# Patient Record
Sex: MALE | URBAN - METROPOLITAN AREA
[De-identification: names, ages, dates, MRNs, and addresses within clinical notes are randomized per-mention and may not be internally consistent; named-entity substitution may affect disease eponyms.]

---

## 2021-08-08 ENCOUNTER — OFFICE VISIT (OUTPATIENT)
Dept: URBAN - METROPOLITAN AREA CLINIC 37 | Facility: CLINIC | Age: 51
End: 2021-08-08

## 2021-09-07 ENCOUNTER — TELEPHONE ENCOUNTER (OUTPATIENT)
Dept: URBAN - METROPOLITAN AREA CLINIC 35 | Facility: CLINIC | Age: 51
End: 2021-09-07

## 2021-09-07 ENCOUNTER — OFFICE VISIT (OUTPATIENT)
Dept: URBAN - METROPOLITAN AREA CLINIC 35 | Facility: CLINIC | Age: 51
End: 2021-09-07

## 2021-09-07 ENCOUNTER — LAB OUTSIDE AN ENCOUNTER (OUTPATIENT)
Dept: URBAN - METROPOLITAN AREA CLINIC 35 | Facility: CLINIC | Age: 51
End: 2021-09-07

## 2021-09-07 VITALS
OXYGEN SATURATION: 99 % | DIASTOLIC BLOOD PRESSURE: 88 MMHG | HEART RATE: 60 BPM | BODY MASS INDEX: 30.16 KG/M2 | SYSTOLIC BLOOD PRESSURE: 144 MMHG | WEIGHT: 235 LBS | HEIGHT: 74 IN

## 2021-09-07 PROBLEM — 248478002: Status: ACTIVE | Noted: 2021-09-07

## 2021-09-07 PROBLEM — 305058001: Status: ACTIVE | Noted: 2021-09-07

## 2021-09-07 PROBLEM — 87522002: Status: ACTIVE | Noted: 2021-09-07

## 2021-09-07 PROBLEM — 420054005: Status: ACTIVE | Noted: 2021-09-07

## 2021-09-07 RX ORDER — DOXAZOSIN MESYLATE 4 MG/1
1 TABLET TABLET ORAL ONCE A DAY
Qty: 30 | Status: ACTIVE | COMMUNITY

## 2021-09-07 RX ORDER — FUROSEMIDE 20 MG/1
1 TABLET TABLET ORAL ONCE A DAY
Qty: 30 | Status: ACTIVE | COMMUNITY

## 2021-09-07 RX ORDER — PREGABALIN 25 MG/1
1 CAPSULE CAPSULE ORAL ONCE A DAY
Status: ACTIVE | COMMUNITY

## 2021-09-07 RX ORDER — SODIUM, POTASSIUM,MAG SULFATES 17.5-3.13G
177ML SOLUTION, RECONSTITUTED, ORAL ORAL ONCE
Qty: 1 KIT | Refills: 0 | OUTPATIENT
Start: 2021-09-07

## 2021-09-07 RX ORDER — SPIRONOLACTONE 50 MG/1
1 TABLET TABLET, FILM COATED ORAL QAM
Qty: 30 | Refills: 2 | OUTPATIENT
Start: 2021-09-07

## 2021-09-07 RX ORDER — METHYLPREDNISOLONE 8 MG/1
1 TABLET WITH FOOD OR MILK TABLET ORAL
Qty: 60 | Status: ACTIVE | COMMUNITY

## 2021-09-07 RX ORDER — TIZANIDINE 4 MG/1
1 TABLET AS NEEDED TABLET ORAL THREE TIMES A DAY
Status: ACTIVE | COMMUNITY

## 2021-09-07 RX ORDER — FOLIC ACID 1 MG/1
1 TABLET TABLET ORAL ONCE A DAY
Qty: 30 | Status: ACTIVE | COMMUNITY

## 2021-09-07 RX ORDER — METHYLPREDNISOLONE 8 MG/1
3 TABLETS TABLET ORAL QAM
Qty: 90 | Refills: 0 | OUTPATIENT
Start: 2021-09-07

## 2021-09-07 NOTE — HPI-MIGRATED HPI
Colorectal cancer screening : Patient is here for initial consultation for -> first time screening colonoscopy;   Colorectal cancer screening : Patients denies -> rectal bleeding, change in bowel habits, constipation, diarrhea, or abdominal pain;   Colorectal cancer screening : Current bowel movement patterns -> 1 soft BM daily??;   Colorectal cancer screening : Family history of GI malignancy: -> Denies;   Initial consultation : Patient is here for -> Alcoholic Cirrhosis  Patient was seen at [facility] on 08/10/2021 - 2021 due to scleral icterus and jaundice.  In the ED, the patient was found to have elevated bilirubin of 11.9, elevated ALT: 95, AST: 260 and thrombocytopenia, normal ammonia level.  During admission also had a CT abdomen/pelvis which showed liver cirrhosis.  Also had a MCRP which showed hepatic steatosis, cirrhosis, portal hypertension, upper abdominal varices, splenomegaly and trace abdominal pelvic ascites. Also showed 3.6 cm lesion in the right kidney upper pole concerning for renal cell carcinoma. Dr. Vikash Sosa was consulted and recommended o/p work-up.  * CT Abdomen/pelvis w/o IV contrast on 08/10/2021:  1. No obstructing renal or ureteral calculi. No acute intra-abdominal abnormalities.  2. cholelithiasis without evidence of cholecystitis  3. Colonic diverticulosis without evidence of diverticulitis  4. Cirrhotic liver morphology.  5. Splenomegaly   * MRI Abdomen w/ + w/o contrast on 2021:  1. Hepatic steatosis and cirrhosis. No evidence of hepatocellular carcinoma.  2. Patent portal vasculature with stigmata of portal hypertension including upper abdominal varices and splenomegaly (measures 15.8 cm). Trace abdominal pelvic ascites.  3. Right kidney upper pole, cystic enhancing lesion measuring 3.6 cm concerning for renal cell carcinoma. Recommend surgical consultation.   Patient was discharged in stable contidion and advised to follow up with me and Urologist.   Patient admits beer consumption in previous years. However, drank heavily between  and  after his divorce. Patient drank vodka and hard liquor during that time. In the last several years, he has only been drinking 3 or 4 times a week and reportedly with a few beer and 2-3 shots of mixed vodka per day.   Patient was seen by GI approximately 8 to 10 years ago due to fatty liver and abnormal liver enzymes but hasn't followed up with anyone in the past couple of years.   Current symptoms: denies confusion, pedal edema or abdominal distention  Last EGD done : reportedly gastric ulcers were found.   *** Labs during admission:  * 08/10/2021: Glu: 97; BUN: 18; Cr: 0.94; Na: 135 (L); K: 3.6; Alb: 3.4 (L); Tbili: 11.9 (H); ALT: 95 (H); AST: 260 (H); ALP: 204 (H); Lipase: 194 (H); Ethanol level: &lt; 10; Acetominophen level: &lt; 10; PT: 16.9 (H); INR: 144; PTT: 30.2; Ammonia: 41 - WBC: 9.0; RBC: 3.64 (L); Hgb: 13.4; Plt: 61 (L)  * 2021: HAV Ab IgM: negative; HBsAg: negative; HBcAb IgM: negative; HCV Ab: negative; INR: 1.67; PT: 19.6 (H); KEVIN screen with reflex: negative; F-[place] Ab Ig (nl); Mitochondrial M2 Ab Ig.7 (nl); Liver Microsome Ab: &lt;1:20; Alph1 AntiTrypsin level: 198 (nl); Alph1 Antitrypsin Phenotype: M1M1 (normal phenotype); AFP: 7.0 Iron level: 39 (L); TIBC: 184 (L); UIBC: 145 (L); Fe sat: 21%   * 2021: Glu: 83; BUN: 17; Cr: 0.83; Na: 138; K: 4.2; Alb: 2.7 (L); Tbili: 10.0 (H); ALT: 69 (H); AST: 162 (H); ALP: 139 (H) - WBC: 5.20; RBC: 2.99 (L); Hgb: 11.1 (L); Hct: 32.1 (L); Plt: 60 (L) Vitamin B12: 2214 (H); Folate: > 20.0; Ferritin: 497 (H);     Colorectal cancer screening : Denies dysphagia or odynophagia Currently taking anticoagulants/NSAIDs: None;

## 2021-09-07 NOTE — EXAM-MIGRATED EXAMINATIONS
GENERAL APPEARANCE: - alert, in no acute distress, well developed, well nourished;   HEAD: - normocephalic, atraumatic;   EYES: - + sclera icteric bilaterally;   EARS: - normal;   THROAT: - clear;   NECK/THYROID: - neck supple, full range of motion, no cervical lymphadenopathy, no thyroid nodules, no thyromegaly, trachea midline;   SKIN: - no suspicious lesions, warm and dry, no spider angiomata, palmar erythema or icterus;   HEART: - no murmurs, regular rate and rhythm, S1, S2 normal;   LUNGS: - clear to auscultation bilaterally, good air movement, no wheezes, rales, rhonchi;   ABDOMEN: - bowel sounds present, no masses palpable, no organomegaly , no rebound tenderness, nontender, + obese;   RECTAL: - deferred by patient;   MUSCULOSKELETAL: - normal posture, normal gait and station, no decreased range of motion;   EXTREMITIES: - no clubbing, cyanosis, + edema in calves and feet;   NEUROLOGIC: - cranial nerves 2-12 grossly intact;   PSYCH: - cooperative with exam, mood/affect full range;   FOOT EXAM: -  ;   DENTAL EXAM: -  ;   BMI not documented -  ;   Weight Assessment -  ;

## 2021-09-10 ENCOUNTER — OFFICE VISIT (OUTPATIENT)
Dept: URBAN - METROPOLITAN AREA SURGERY CENTER 8 | Facility: SURGERY CENTER | Age: 51
End: 2021-09-10

## 2021-09-15 ENCOUNTER — OFFICE VISIT (OUTPATIENT)
Dept: URBAN - METROPOLITAN AREA SURGERY CENTER 8 | Facility: SURGERY CENTER | Age: 51
End: 2021-09-15

## 2021-09-20 ENCOUNTER — TELEPHONE ENCOUNTER (OUTPATIENT)
Dept: URBAN - METROPOLITAN AREA CLINIC 35 | Facility: CLINIC | Age: 51
End: 2021-09-20

## 2021-09-21 ENCOUNTER — LAB OUTSIDE AN ENCOUNTER (OUTPATIENT)
Dept: URBAN - METROPOLITAN AREA CLINIC 35 | Facility: CLINIC | Age: 51
End: 2021-09-21

## 2021-09-26 ENCOUNTER — WEB ENCOUNTER (OUTPATIENT)
Dept: URBAN - METROPOLITAN AREA CLINIC 37 | Facility: CLINIC | Age: 51
End: 2021-09-26

## 2021-10-01 ENCOUNTER — WEB ENCOUNTER (OUTPATIENT)
Dept: URBAN - METROPOLITAN AREA CLINIC 37 | Facility: CLINIC | Age: 51
End: 2021-10-01

## 2021-10-06 ENCOUNTER — WEB ENCOUNTER (OUTPATIENT)
Dept: URBAN - METROPOLITAN AREA CLINIC 37 | Facility: CLINIC | Age: 51
End: 2021-10-06

## 2021-10-07 ENCOUNTER — OFFICE VISIT (OUTPATIENT)
Dept: URBAN - METROPOLITAN AREA CLINIC 37 | Facility: CLINIC | Age: 51
End: 2021-10-07

## 2024-05-07 ENCOUNTER — TELEPHONE (OUTPATIENT)
Dept: FAMILY MEDICINE | Age: 54
End: 2024-05-07

## 2024-05-14 ENCOUNTER — APPOINTMENT (OUTPATIENT)
Dept: INTERNAL MEDICINE | Age: 54
End: 2024-05-14

## 2024-05-14 VITALS
DIASTOLIC BLOOD PRESSURE: 80 MMHG | HEIGHT: 69 IN | SYSTOLIC BLOOD PRESSURE: 150 MMHG | WEIGHT: 199 LBS | OXYGEN SATURATION: 95 % | BODY MASS INDEX: 29.47 KG/M2 | HEART RATE: 80 BPM

## 2024-05-14 DIAGNOSIS — Z00.00 HEALTHCARE MAINTENANCE: ICD-10-CM

## 2024-05-14 DIAGNOSIS — Z12.11 SCREENING FOR COLON CANCER: ICD-10-CM

## 2024-05-14 DIAGNOSIS — Z87.19 H/O UMBILICAL HERNIA REPAIR: ICD-10-CM

## 2024-05-14 DIAGNOSIS — Z76.89 ENCOUNTER TO ESTABLISH CARE: Primary | ICD-10-CM

## 2024-05-14 DIAGNOSIS — E78.1 HYPERTRIGLYCERIDEMIA: ICD-10-CM

## 2024-05-14 DIAGNOSIS — Z96.653 HISTORY OF BILATERAL KNEE REPLACEMENT: ICD-10-CM

## 2024-05-14 DIAGNOSIS — Z12.5 SCREENING FOR PROSTATE CANCER: ICD-10-CM

## 2024-05-14 DIAGNOSIS — E66.3 OVERWEIGHT (BMI 25.0-29.9): ICD-10-CM

## 2024-05-14 DIAGNOSIS — I10 PRIMARY HYPERTENSION: ICD-10-CM

## 2024-05-14 DIAGNOSIS — Z98.890 H/O UMBILICAL HERNIA REPAIR: ICD-10-CM

## 2024-05-14 PROCEDURE — 99204 OFFICE O/P NEW MOD 45 MIN: CPT | Performed by: NURSE PRACTITIONER

## 2024-05-14 RX ORDER — LISINOPRIL 10 MG/1
10 TABLET ORAL DAILY
Qty: 30 TABLET | Refills: 1 | Status: SHIPPED | OUTPATIENT
Start: 2024-05-14

## 2024-05-14 ASSESSMENT — ANXIETY QUESTIONNAIRES
7. FEELING AFRAID AS IF SOMETHING AWFUL MIGHT HAPPEN: NOT AT ALL
2. NOT BEING ABLE TO STOP OR CONTROL WORRYING: NOT AT ALL
6. BECOMING EASILY ANNOYED OR IRRITABLE: 1
6. BECOMING EASILY ANNOYED OR IRRITABLE: SEVERAL DAYS
2. NOT BEING ABLE TO STOP OR CONTROL WORRYING: 0
3. WORRYING TOO MUCH ABOUT DIFFERENT THINGS: SEVERAL DAYS
7. FEELING AFRAID AS IF SOMETHING AWFUL MIGHT HAPPEN: 0
5. BEING SO RESTLESS THAT IT IS HARD TO SIT STILL: NOT AT ALL
3. WORRYING TOO MUCH ABOUT DIFFERENT THINGS: 1
GAD7 TOTAL SCORE: 2
1. FEELING NERVOUS, ANXIOUS, OR ON EDGE: 0
5. BEING SO RESTLESS THAT IT IS HARD TO SIT STILL: 0
4. TROUBLE RELAXING: NOT AT ALL
1. FEELING NERVOUS, ANXIOUS, OR ON EDGE: NOT AT ALL
4. TROUBLE RELAXING: 0

## 2024-05-14 ASSESSMENT — PATIENT HEALTH QUESTIONNAIRE - PHQ9
SUM OF ALL RESPONSES TO PHQ9 QUESTIONS 1 AND 2: 0
1. LITTLE INTEREST OR PLEASURE IN DOING THINGS: NOT AT ALL
SUM OF ALL RESPONSES TO PHQ9 QUESTIONS 1 AND 2: 0
2. FEELING DOWN, DEPRESSED OR HOPELESS: NOT AT ALL
CLINICAL INTERPRETATION OF PHQ2 SCORE: NO FURTHER SCREENING NEEDED

## 2024-05-21 ENCOUNTER — APPOINTMENT (OUTPATIENT)
Dept: INTERNAL MEDICINE | Age: 54
End: 2024-05-21

## 2024-05-21 ENCOUNTER — TELEPHONE (OUTPATIENT)
Dept: INTERNAL MEDICINE | Age: 54
End: 2024-05-21

## 2024-05-21 VITALS — OXYGEN SATURATION: 97 % | SYSTOLIC BLOOD PRESSURE: 142 MMHG | DIASTOLIC BLOOD PRESSURE: 96 MMHG | HEART RATE: 67 BPM

## 2024-05-21 DIAGNOSIS — I10 PRIMARY HYPERTENSION: Primary | ICD-10-CM

## 2024-05-22 RX ORDER — LISINOPRIL 10 MG/1
20 TABLET ORAL DAILY
Qty: 30 TABLET | Refills: 1 | Status: SHIPPED | OUTPATIENT
Start: 2024-05-22

## 2024-05-23 ENCOUNTER — LAB SERVICES (OUTPATIENT)
Dept: LAB | Age: 54
End: 2024-05-23

## 2024-05-23 DIAGNOSIS — Z00.00 HEALTHCARE MAINTENANCE: ICD-10-CM

## 2024-05-23 DIAGNOSIS — E78.1 HYPERTRIGLYCERIDEMIA: ICD-10-CM

## 2024-05-23 DIAGNOSIS — Z12.5 SCREENING FOR PROSTATE CANCER: ICD-10-CM

## 2024-05-23 DIAGNOSIS — Z76.89 ENCOUNTER TO ESTABLISH CARE: ICD-10-CM

## 2024-05-23 DIAGNOSIS — E66.3 OVERWEIGHT (BMI 25.0-29.9): ICD-10-CM

## 2024-05-23 LAB
25(OH)D3+25(OH)D2 SERPL-MCNC: 29.6 NG/ML (ref 30–100)
ALBUMIN SERPL-MCNC: 4.1 G/DL (ref 3.6–5.1)
ALBUMIN/GLOB SERPL: 1.4 {RATIO} (ref 1–2.4)
ALP SERPL-CCNC: 78 UNITS/L (ref 45–117)
ALT SERPL-CCNC: 55 UNITS/L
ANION GAP SERPL CALC-SCNC: 15 MMOL/L (ref 7–19)
AST SERPL-CCNC: 28 UNITS/L
BASOPHILS # BLD: 0.1 K/MCL (ref 0–0.3)
BASOPHILS NFR BLD: 1 %
BILIRUB SERPL-MCNC: 1.4 MG/DL (ref 0.2–1)
BUN SERPL-MCNC: 17 MG/DL (ref 6–20)
BUN/CREAT SERPL: 15 (ref 7–25)
CALCIUM SERPL-MCNC: 9.1 MG/DL (ref 8.4–10.2)
CHLORIDE SERPL-SCNC: 104 MMOL/L (ref 97–110)
CHOLEST SERPL-MCNC: 111 MG/DL
CHOLEST/HDLC SERPL: 3.3 {RATIO}
CO2 SERPL-SCNC: 25 MMOL/L (ref 21–32)
CREAT SERPL-MCNC: 1.12 MG/DL (ref 0.67–1.17)
DEPRECATED RDW RBC: 41.9 FL (ref 39–50)
EGFRCR SERPLBLD CKD-EPI 2021: 77 ML/MIN/{1.73_M2}
EOSINOPHIL # BLD: 0.3 K/MCL (ref 0–0.5)
EOSINOPHIL NFR BLD: 4 %
ERYTHROCYTE [DISTWIDTH] IN BLOOD: 12.4 % (ref 11–15)
FASTING DURATION TIME PATIENT: 11 HOURS (ref 0–999)
GLOBULIN SER-MCNC: 2.9 G/DL (ref 2–4)
GLUCOSE SERPL-MCNC: 106 MG/DL (ref 70–99)
HCT VFR BLD CALC: 50.1 % (ref 39–51)
HDLC SERPL-MCNC: 34 MG/DL
HGB BLD-MCNC: 17 G/DL (ref 13–17)
IMM GRANULOCYTES # BLD AUTO: 0 K/MCL (ref 0–0.2)
IMM GRANULOCYTES # BLD: 0 %
LDLC SERPL CALC-MCNC: 35 MG/DL
LYMPHOCYTES # BLD: 1.6 K/MCL (ref 1–4)
LYMPHOCYTES NFR BLD: 26 %
MCH RBC QN AUTO: 31.3 PG (ref 26–34)
MCHC RBC AUTO-ENTMCNC: 33.9 G/DL (ref 32–36.5)
MCV RBC AUTO: 92.1 FL (ref 78–100)
MONOCYTES # BLD: 0.5 K/MCL (ref 0.3–0.9)
MONOCYTES NFR BLD: 9 %
NEUTROPHILS # BLD: 3.7 K/MCL (ref 1.8–7.7)
NEUTROPHILS NFR BLD: 60 %
NONHDLC SERPL-MCNC: 77 MG/DL
NRBC BLD MANUAL-RTO: 0 /100 WBC
PLATELET # BLD AUTO: 225 K/MCL (ref 140–450)
POTASSIUM SERPL-SCNC: 4 MMOL/L (ref 3.4–5.1)
PROT SERPL-MCNC: 7 G/DL (ref 6.4–8.2)
PSA SERPL-MCNC: 0.74 NG/ML
RBC # BLD: 5.44 MIL/MCL (ref 4.5–5.9)
SODIUM SERPL-SCNC: 140 MMOL/L (ref 135–145)
TRIGL SERPL-MCNC: 208 MG/DL
WBC # BLD: 6.1 K/MCL (ref 4.2–11)

## 2024-05-23 PROCEDURE — 80053 COMPREHEN METABOLIC PANEL: CPT | Performed by: INTERNAL MEDICINE

## 2024-05-23 PROCEDURE — 85025 COMPLETE CBC W/AUTO DIFF WBC: CPT | Performed by: INTERNAL MEDICINE

## 2024-05-23 PROCEDURE — 80061 LIPID PANEL: CPT | Performed by: INTERNAL MEDICINE

## 2024-05-23 PROCEDURE — 36415 COLL VENOUS BLD VENIPUNCTURE: CPT | Performed by: INTERNAL MEDICINE

## 2024-05-23 PROCEDURE — 84153 ASSAY OF PSA TOTAL: CPT | Performed by: CLINICAL MEDICAL LABORATORY

## 2024-05-23 PROCEDURE — 82306 VITAMIN D 25 HYDROXY: CPT | Performed by: CLINICAL MEDICAL LABORATORY

## 2024-05-24 ENCOUNTER — TELEPHONE (OUTPATIENT)
Dept: INTERNAL MEDICINE | Age: 54
End: 2024-05-24

## 2024-05-24 ENCOUNTER — APPOINTMENT (OUTPATIENT)
Dept: LAB | Age: 54
End: 2024-05-24

## 2024-05-30 ENCOUNTER — APPOINTMENT (OUTPATIENT)
Dept: INTERNAL MEDICINE | Age: 54
End: 2024-05-30

## 2024-08-02 ENCOUNTER — APPOINTMENT (OUTPATIENT)
Dept: INTERNAL MEDICINE | Age: 54
End: 2024-08-02

## 2024-08-02 VITALS
DIASTOLIC BLOOD PRESSURE: 98 MMHG | HEART RATE: 74 BPM | RESPIRATION RATE: 18 BRPM | WEIGHT: 193.9 LBS | HEIGHT: 69 IN | OXYGEN SATURATION: 97 % | TEMPERATURE: 96.8 F | SYSTOLIC BLOOD PRESSURE: 136 MMHG | BODY MASS INDEX: 28.72 KG/M2

## 2024-08-02 DIAGNOSIS — F41.9 ANXIETY: ICD-10-CM

## 2024-08-02 DIAGNOSIS — Z00.00 HEALTHCARE MAINTENANCE: ICD-10-CM

## 2024-08-02 DIAGNOSIS — M19.079 ARTHRITIS OF BIG TOE: ICD-10-CM

## 2024-08-02 DIAGNOSIS — Z12.5 SCREENING FOR PROSTATE CANCER: ICD-10-CM

## 2024-08-02 DIAGNOSIS — Z96.653 HISTORY OF BILATERAL KNEE REPLACEMENT: ICD-10-CM

## 2024-08-02 DIAGNOSIS — E78.1 HYPERTRIGLYCERIDEMIA: ICD-10-CM

## 2024-08-02 DIAGNOSIS — Z87.19 H/O UMBILICAL HERNIA REPAIR: ICD-10-CM

## 2024-08-02 DIAGNOSIS — E55.9 VITAMIN D DEFICIENCY: ICD-10-CM

## 2024-08-02 DIAGNOSIS — E66.3 OVERWEIGHT (BMI 25.0-29.9): ICD-10-CM

## 2024-08-02 DIAGNOSIS — Z98.890 H/O UMBILICAL HERNIA REPAIR: ICD-10-CM

## 2024-08-02 DIAGNOSIS — Z00.00 ANNUAL PHYSICAL EXAM: Primary | ICD-10-CM

## 2024-08-02 DIAGNOSIS — I10 PRIMARY HYPERTENSION: ICD-10-CM

## 2024-08-02 DIAGNOSIS — H61.23 BILATERAL IMPACTED CERUMEN: ICD-10-CM

## 2024-08-02 DIAGNOSIS — M24.849 LOCKING FINGER JOINT: ICD-10-CM

## 2024-08-02 ASSESSMENT — ANXIETY QUESTIONNAIRES
4. TROUBLE RELAXING: 0
2. NOT BEING ABLE TO STOP OR CONTROL WORRYING: NOT AT ALL
6. BECOMING EASILY ANNOYED OR IRRITABLE: 0
7. FEELING AFRAID AS IF SOMETHING AWFUL MIGHT HAPPEN: NOT AT ALL
7. FEELING AFRAID AS IF SOMETHING AWFUL MIGHT HAPPEN: 0
5. BEING SO RESTLESS THAT IT IS HARD TO SIT STILL: NOT AT ALL
3. WORRYING TOO MUCH ABOUT DIFFERENT THINGS: 0
1. FEELING NERVOUS, ANXIOUS, OR ON EDGE: NOT AT ALL
1. FEELING NERVOUS, ANXIOUS, OR ON EDGE: 0
5. BEING SO RESTLESS THAT IT IS HARD TO SIT STILL: 0
GAD7 TOTAL SCORE: 0
6. BECOMING EASILY ANNOYED OR IRRITABLE: NOT AT ALL
IF YOU CHECKED OFF ANY PROBLEMS ON THIS QUESTIONNAIRE, HOW DIFFICULT HAVE THESE PROBLEMS MADE IT FOR YOU TO DO YOUR WORK, TAKE CARE OF THINGS AT HOME, OR GET ALONG WITH OTHER PEOPLE: NOT DIFFICULT AT ALL
3. WORRYING TOO MUCH ABOUT DIFFERENT THINGS: NOT AT ALL
2. NOT BEING ABLE TO STOP OR CONTROL WORRYING: 0
4. TROUBLE RELAXING: NOT AT ALL

## 2024-08-02 ASSESSMENT — PATIENT HEALTH QUESTIONNAIRE - PHQ9
3. TROUBLE FALLING OR STAYING ASLEEP OR SLEEPING TOO MUCH: NOT AT ALL
4. FEELING TIRED OR HAVING LITTLE ENERGY: SEVERAL DAYS
SUM OF ALL RESPONSES TO PHQ9 QUESTIONS 1 AND 2: 0
SUM OF ALL RESPONSES TO PHQ QUESTIONS 1-9: 1
7. TROUBLE CONCENTRATING ON THINGS, SUCH AS READING THE NEWSPAPER OR WATCHING TELEVISION: NOT AT ALL
CLINICAL INTERPRETATION OF PHQ2 SCORE: NO FURTHER SCREENING NEEDED
5. POOR APPETITE, WEIGHT LOSS, OR OVEREATING: NOT AT ALL
9. THOUGHTS THAT YOU WOULD BE BETTER OFF DEAD, OR OF HURTING YOURSELF: NOT AT ALL
SUM OF ALL RESPONSES TO PHQ9 QUESTIONS 1 AND 2: 0
2. FEELING DOWN, DEPRESSED OR HOPELESS: NOT AT ALL
CLINICAL INTERPRETATION OF PHQ9 SCORE: MINIMAL DEPRESSION
1. LITTLE INTEREST OR PLEASURE IN DOING THINGS: NOT AT ALL
6. FEELING BAD ABOUT YOURSELF - OR THAT YOU ARE A FAILURE OR HAVE LET YOURSELF OR YOUR FAMILY DOWN: NOT AT ALL
8. MOVING OR SPEAKING SO SLOWLY THAT OTHER PEOPLE COULD HAVE NOTICED. OR THE OPPOSITE, BEING SO FIGETY OR RESTLESS THAT YOU HAVE BEEN MOVING AROUND A LOT MORE THAN USUAL: NOT AT ALL
10. IF YOU CHECKED OFF ANY PROBLEMS, HOW DIFFICULT HAVE THESE PROBLEMS MADE IT FOR YOU TO DO YOUR WORK, TAKE CARE OF THINGS AT HOME, OR GET ALONG WITH OTHER PEOPLE: NOT DIFFICULT AT ALL

## 2024-08-13 DIAGNOSIS — I10 PRIMARY HYPERTENSION: ICD-10-CM

## 2024-08-14 RX ORDER — LISINOPRIL 10 MG/1
20 TABLET ORAL DAILY
Qty: 30 TABLET | Refills: 1 | Status: SHIPPED | OUTPATIENT
Start: 2024-08-14

## 2024-08-27 DIAGNOSIS — I10 PRIMARY HYPERTENSION: ICD-10-CM

## 2024-08-27 RX ORDER — LISINOPRIL 10 MG/1
20 TABLET ORAL DAILY
Qty: 30 TABLET | Refills: 1 | Status: SHIPPED | OUTPATIENT
Start: 2024-08-27

## 2024-09-16 DIAGNOSIS — I10 PRIMARY HYPERTENSION: ICD-10-CM

## 2024-09-17 RX ORDER — LISINOPRIL 20 MG/1
20 TABLET ORAL DAILY
Qty: 90 TABLET | Refills: 1 | Status: SHIPPED | OUTPATIENT
Start: 2024-09-17

## 2025-03-27 DIAGNOSIS — I10 PRIMARY HYPERTENSION: ICD-10-CM

## 2025-03-27 RX ORDER — LISINOPRIL 20 MG/1
20 TABLET ORAL DAILY
Qty: 90 TABLET | Refills: 1 | Status: SHIPPED | OUTPATIENT
Start: 2025-03-27

## 2025-07-31 ENCOUNTER — APPOINTMENT (OUTPATIENT)
Dept: LAB | Age: 55
End: 2025-07-31

## 2025-08-07 ENCOUNTER — APPOINTMENT (OUTPATIENT)
Dept: INTERNAL MEDICINE | Age: 55
End: 2025-08-07

## 2025-09-11 ENCOUNTER — APPOINTMENT (OUTPATIENT)
Dept: INTERNAL MEDICINE | Age: 55
End: 2025-09-11